# Patient Record
Sex: FEMALE | Race: BLACK OR AFRICAN AMERICAN | NOT HISPANIC OR LATINO | Employment: FULL TIME | URBAN - METROPOLITAN AREA
[De-identification: names, ages, dates, MRNs, and addresses within clinical notes are randomized per-mention and may not be internally consistent; named-entity substitution may affect disease eponyms.]

---

## 2022-12-20 ENCOUNTER — HOSPITAL ENCOUNTER (OUTPATIENT)
Dept: DATA CONVERSION | Facility: HOSPITAL | Age: 32
End: 2022-12-20
Attending: OBSTETRICS & GYNECOLOGY

## 2022-12-20 DIAGNOSIS — B00.9 HERPESVIRAL INFECTION, UNSPECIFIED: ICD-10-CM

## 2022-12-20 DIAGNOSIS — O22.02: ICD-10-CM

## 2022-12-20 DIAGNOSIS — O98.512 OTHER VIRAL DISEASES COMPLICATING PREGNANCY, SECOND TRIMESTER (HHS-HCC): ICD-10-CM

## 2022-12-20 DIAGNOSIS — R25.2 CRAMP AND SPASM: ICD-10-CM

## 2022-12-20 DIAGNOSIS — O26.892 OTHER SPECIFIED PREGNANCY RELATED CONDITIONS, SECOND TRIMESTER (HHS-HCC): ICD-10-CM

## 2022-12-20 DIAGNOSIS — N89.8 OTHER SPECIFIED NONINFLAMMATORY DISORDERS OF VAGINA: ICD-10-CM

## 2022-12-20 DIAGNOSIS — I83.90 ASYMPTOMATIC VARICOSE VEINS OF UNSPECIFIED LOWER EXTREMITY: ICD-10-CM

## 2022-12-20 DIAGNOSIS — O99.891 OTHER SPECIFIED DISEASES AND CONDITIONS COMPLICATING PREGNANCY (HHS-HCC): ICD-10-CM

## 2022-12-20 DIAGNOSIS — O09.292 SUPERVISION OF PREGNANCY WITH OTHER POOR REPRODUCTIVE OR OBSTETRIC HISTORY, SECOND TRIMESTER (HHS-HCC): ICD-10-CM

## 2022-12-20 DIAGNOSIS — O26.852 SPOTTING COMPLICATING PREGNANCY, SECOND TRIMESTER (HHS-HCC): ICD-10-CM

## 2022-12-20 DIAGNOSIS — Z3A.24 24 WEEKS GESTATION OF PREGNANCY (HHS-HCC): ICD-10-CM

## 2022-12-20 DIAGNOSIS — E66.9 OBESITY, UNSPECIFIED: ICD-10-CM

## 2022-12-20 DIAGNOSIS — O99.212 OBESITY COMPLICATING PREGNANCY, SECOND TRIMESTER (HHS-HCC): ICD-10-CM

## 2022-12-20 DIAGNOSIS — Z34.80 ENCOUNTER FOR SUPERVISION OF OTHER NORMAL PREGNANCY, UNSPECIFIED TRIMESTER (HHS-HCC): ICD-10-CM

## 2022-12-20 DIAGNOSIS — O09.32 SUPERVISION OF PREGNANCY WITH INSUFFICIENT ANTENATAL CARE, SECOND TRIMESTER (HHS-HCC): ICD-10-CM

## 2022-12-20 LAB
ESTIMATED AVERAGE GLUCOSE FOR HBA1C: 108 MG/DL
HEMOGLOBIN A1C/HEMOGLOBIN TOTAL IN BLOOD: 5.4 %

## 2022-12-21 LAB
CHLAMYDIA TRACH., AMPLIFIED: NEGATIVE
CLUE CELLS: PRESENT
N. GONORRHEA, AMPLIFIED: NEGATIVE
NUGENT SCORE: 5
TRICHOMONAS VAGINALIS: NEGATIVE
YEAST: PRESENT

## 2022-12-22 LAB
HEMOGLOBIN A2: 3 %
HEMOGLOBIN A: 96.5 %
HEMOGLOBIN F: 0.5 %
HEMOGLOBIN IDENTIFICATION INTERPRETATION: NORMAL
PATH REVIEW-HGB IDENTIFICATION: NORMAL

## 2023-03-01 NOTE — PROGRESS NOTES
"    Current Stage:   Stage: Triage     OB Dating:   EDC/EGA:  ·  Final NOLVIA 2023   ·  EGA 24.4     Subjective Data:   Antepartum:  Vaginal Bleeding: Yes  Light pink spotting   Contractions/Abdominal Pain: No   Discharge/Loss of Fluid: No   Fetal Movement: Good   Fevers/Chills: No   Preeclampsia Symptoms: No   Antepartum:    Pari is a 33yo  at 24.4wks b/o 15.5wk U/S who presents to triage for spotting and cramping.  She reports an episode of light pink spotting when wiping  after using the restroom.  She denies heavy vaginal bleeding.  She denies dysuria or flank pain.  She reports feeling \"yeasty\" but denies vaginal irritation or change in discharge.  She denies LOF and endorses good fetal movement.    Pregnancy notable for:  - Limited prenatal care  - PEC PP in last pregnancy, readmitted, on bASA  - Class III obesity  - HSV, no recent outbreaks, for suppression at 36wks  - H/o PPH after 2017 delivery, reports needing blood transfusion, T&C at delivery  - PCN allergy, hives and tongue swelling, h/o GBS+ in prior pregnancy  - Forceps assisted delivery   - Desires PPTL, states consents signed on     ObHx:     @ 39.5wks, PEC PP readmission    @ 39.5wks, c/b PPH requiring blood transfusion    @ 40.5wks    @ 36wks, poor dating    @ 42wks, forceps assisted  IAB x6  SAB x1    GynHx: Abnl pap  ASCUS w/ HPV+; Oct 2022 SAURABH I on colpo  PMH: denies  PSH: s/p cervical fusion 22, follows with neurology, completed PT  PFH: reviewed, non-contributory  Meds: PNV  All: PCN (swelling of tongue, hives), benadryl (swelling)  Social: denies e/t/i      Objective Information:    Objective Information:      T   P  R  BP   MAP  SpO2   Value  37  94  16  134/65   91  100%  Date/Time  13:07  13:45  13:07  13:23   13:23  13:45  Range  (36.6C - 37C )  (86 - 95 )  (16 - 18 )  (121 - 141 )/ (65 - 81 )  (91 - 102 )  (98% - 100% )  Highest temp of " 37 C was recorded at  13:07      Pain reported at  13:07: 0 = None      Physical Exam:   Constitutional: Alert, conversational, well-appearing   Obstetric: Cervical Exam: visually not dilated    FHT: 150, mod variability, +accels, -decels   [place]: quiet    SSE:  - No active bleeding from cervix  - No blood present in vault  - Milky white vaginal discharge present   Eyes: Sclera white, EOM intact, no discharge or erythema   ENMT: No hearing deficit, no goiter present   Head/Neck: Normocephalic, atraumatic, full range  of motion intact   Respiratory/Thorax: No increased work of breathing,  no cough present, rate normal, clear to auscultation   Cardiovascular: Generalized lower extremity edema  present, RRR   Gastrointestinal: Gravid   Genitourinary: No suprapubic tenderness   Musculoskeletal: Strength +5 in all extremities bilaterally   Extremities: No calf tenderness, redness or warmth,  varicose veins noted bilaterally on lower extremities   Neurological: A/O x3, conversational   Breast: No redness or warmth   Psychological: Behavior appropriate, interactive   Skin: No rashes or lesions      Testing:   NST Interpretation - Baby A:  ·  Baseline    ·  Variability moderate (amplitude range 6 to 25 bpm)   ·  Interpretation Appropriate for EGA (2 10x10 accels)   ·  Accelerations Present   ·  Decelerations Absent     Biophysical Profile - Baby A:  ·  Fetal Reactive Reactive non stress test     Assessment and Plan:        Additional Dx:   Non-stress test reactive: Entered Date: 20-Dec-2022 14:27   Vaginal discharge in pregnancy: Entered Date: 20-Dec-2022  14:27   24 weeks gestation of pregnancy: Entered Date: 20-Dec-2022  14:27    Assessment:    Pari is a 31yo  at 24.4wks b/o 15.5wk U/S who presents to triage for spotting and cramping.     Vaginal Discharge  - Noted to be light pink when wiping  - No active bleeding or blood present in vault  - Vaginitis swab and UC collected and sent,  will call for abnl results  - Cervix visually not dilated  - RH+ blood type    IUP at 24.4wks  - NST appropriate for gestational age  - Good fetal movement  - Encouraged prenatal care and discussed barriers to care--> pt states appts are hard to get and did not like initial provider she saw for this pregnancy and was apprehensive to care b/o this  - Has good rapport with Dr. Olmstead and has scheduled appt in January, encouraged to keep and attend  - No prenatal labs drawn, collected today, to follow-up in office for abnl results  - Precautions to return discussed     Varicose Veins  - Has attempted to get compression stockings but have not come in yet  - Nurse tasked to fulfill prescription at office visit in [place]    Maternal Well-being  - Vital signs stable and WNL  - Emotional support and reassurance provided  - All questions and concerns addressed     Plan and tracing discussed with Dr. Osorio who reviewed tracing and agrees with d/c home.     Petra Nielsen, MSN, APRN, FNP-C      Attestation:   Note Completion:  I am a:  Advanced Practice Provider   Attending Only - Shared Visit with Advanced Practice Provider This is a shared visit.  I have reviewed the Advanced Practice Provider?s encounter note, approve the Advanced Practice Provider?s documentation,  and provide the following additional information from my personal encounter.    Comments/ Additional Findings    pt seen.  no pain or ctx.    Baby quite active and FHR tracing reassuring for 24 weeks  Discharge home.  F/u at next OB appt          Electronic Signatures:  Carlton Osorio)  (Signed 20-Dec-2022 15:33)   Authored: Note Completion   Co-Signer: Assessment and Plan, Note Completion  Petra Nielsen (APRN-CNP)  (Signed 20-Dec-2022 15:21)   Authored: Current Stage, OB Dating, Subjective Data,  Objective Data,  Testing, Assessment and Plan, Note Completion      Last Updated: 20-Dec-2022 15:33 by Carlton Osorio)

## 2023-03-02 LAB — URINE CULTURE: ABNORMAL

## 2023-03-10 LAB
CREATININE (MG/DL) IN URINE: 126 MG/DL (ref 20–320)
ERYTHROCYTE DISTRIBUTION WIDTH (RATIO) BY AUTOMATED COUNT: 14.8 % (ref 11.5–14.5)
ERYTHROCYTE MEAN CORPUSCULAR HEMOGLOBIN CONCENTRATION (G/DL) BY AUTOMATED: 31.2 G/DL (ref 32–36)
ERYTHROCYTE MEAN CORPUSCULAR VOLUME (FL) BY AUTOMATED COUNT: 76 FL (ref 80–100)
ERYTHROCYTES (10*6/UL) IN BLOOD BY AUTOMATED COUNT: 3.73 X10E12/L (ref 4–5.2)
FERRITIN, PREGNANCY: 18 UG/L
HEMATOCRIT (%) IN BLOOD BY AUTOMATED COUNT: 28.5 % (ref 36–46)
HEMOGLOBIN (G/DL) IN BLOOD: 8.9 G/DL (ref 12–16)
IRON (UG/DL) IN SER/PLAS IN PREGNANCY: 251 UG/DL
IRON BINDING CAPACITY (UG/DL) IN PREGNANCY: 667 UG/DL
IRON SATURATION (%) IN PREGNANCY: 38 %
LEUKOCYTES (10*3/UL) IN BLOOD BY AUTOMATED COUNT: 8.2 X10E9/L (ref 4.4–11.3)
NRBC (PER 100 WBCS) BY AUTOMATED COUNT: 0 /100 WBC (ref 0–0)
PLATELETS (10*3/UL) IN BLOOD AUTOMATED COUNT: 157 X10E9/L (ref 150–450)
PROTEIN (MG/DL) IN URINE: 45 MG/DL (ref 5–24)
PROTEIN/CREATININE (MG/MG) IN URINE: 0.36 MG/MG CREAT (ref 0–0.17)
REFLEX ADDED, ANEMIA PANEL: ABNORMAL

## 2023-03-11 LAB — URINE CULTURE: NORMAL

## 2023-03-13 LAB — GROUP B STREP SCREEN: ABNORMAL

## 2023-03-22 LAB — URINE CULTURE: ABNORMAL

## 2023-04-06 ENCOUNTER — HOSPITAL ENCOUNTER (OUTPATIENT)
Dept: DATA CONVERSION | Facility: HOSPITAL | Age: 33
End: 2023-04-06
Attending: OBSTETRICS & GYNECOLOGY

## 2023-04-06 DIAGNOSIS — D64.9 ANEMIA, UNSPECIFIED: ICD-10-CM

## 2023-04-06 DIAGNOSIS — R51.9 HEADACHE, UNSPECIFIED: ICD-10-CM

## 2023-04-06 DIAGNOSIS — Z98.1 ARTHRODESIS STATUS: ICD-10-CM

## 2023-04-06 DIAGNOSIS — R03.0 ELEVATED BLOOD-PRESSURE READING, WITHOUT DIAGNOSIS OF HYPERTENSION: ICD-10-CM

## 2023-04-06 DIAGNOSIS — B00.9 HERPESVIRAL INFECTION, UNSPECIFIED: ICD-10-CM

## 2023-04-06 DIAGNOSIS — E66.9 OBESITY, UNSPECIFIED: ICD-10-CM

## 2023-04-06 LAB
ALANINE AMINOTRANSFERASE (SGPT) (U/L) IN SER/PLAS: 35 U/L (ref 7–45)
ALBUMIN (G/DL) IN SER/PLAS: 3.5 G/DL (ref 3.4–5)
ALKALINE PHOSPHATASE (U/L) IN SER/PLAS: 97 U/L (ref 33–110)
ANION GAP IN SER/PLAS: 16 MMOL/L (ref 10–20)
ASPARTATE AMINOTRANSFERASE (SGOT) (U/L) IN SER/PLAS: 24 U/L (ref 9–39)
BILIRUBIN TOTAL (MG/DL) IN SER/PLAS: 0.3 MG/DL (ref 0–1.2)
CALCIUM (MG/DL) IN SER/PLAS: 9 MG/DL (ref 8.6–10.6)
CARBON DIOXIDE, TOTAL (MMOL/L) IN SER/PLAS: 23 MMOL/L (ref 21–32)
CHLORIDE (MMOL/L) IN SER/PLAS: 105 MMOL/L (ref 98–107)
CREATININE (MG/DL) IN SER/PLAS: 0.5 MG/DL (ref 0.5–1.05)
ERYTHROCYTE DISTRIBUTION WIDTH (RATIO) BY AUTOMATED COUNT: 18.9 % (ref 11.5–14.5)
ERYTHROCYTE MEAN CORPUSCULAR HEMOGLOBIN CONCENTRATION (G/DL) BY AUTOMATED: 30.7 G/DL (ref 32–36)
ERYTHROCYTE MEAN CORPUSCULAR VOLUME (FL) BY AUTOMATED COUNT: 79 FL (ref 80–100)
ERYTHROCYTES (10*6/UL) IN BLOOD BY AUTOMATED COUNT: 3.82 X10E12/L (ref 4–5.2)
GFR FEMALE: >90 ML/MIN/1.73M2
GLUCOSE (MG/DL) IN SER/PLAS: 75 MG/DL (ref 74–99)
HEMATOCRIT (%) IN BLOOD BY AUTOMATED COUNT: 30.3 % (ref 36–46)
HEMOGLOBIN (G/DL) IN BLOOD: 9.3 G/DL (ref 12–16)
LEUKOCYTES (10*3/UL) IN BLOOD BY AUTOMATED COUNT: 5.6 X10E9/L (ref 4.4–11.3)
NRBC (PER 100 WBCS) BY AUTOMATED COUNT: 0 /100 WBC (ref 0–0)
PLATELETS (10*3/UL) IN BLOOD AUTOMATED COUNT: 204 X10E9/L (ref 150–450)
POTASSIUM (MMOL/L) IN SER/PLAS: 3.9 MMOL/L (ref 3.5–5.3)
PROTEIN TOTAL: 6.6 G/DL (ref 6.4–8.2)
SODIUM (MMOL/L) IN SER/PLAS: 140 MMOL/L (ref 136–145)
UREA NITROGEN (MG/DL) IN SER/PLAS: 8 MG/DL (ref 6–23)

## 2023-04-07 LAB
CREATININE (MG/DL) IN URINE: NORMAL
PROTEIN (MG/DL) IN URINE: NORMAL
PROTEIN/CREATININE (MG/MG) IN URINE: NORMAL

## 2023-09-06 VITALS — WEIGHT: 279.54 LBS | HEIGHT: 65 IN | BODY MASS INDEX: 46.57 KG/M2

## 2023-09-07 VITALS
BODY MASS INDEX: 52.37 KG/M2 | HEART RATE: 95 BPM | SYSTOLIC BLOOD PRESSURE: 121 MMHG | DIASTOLIC BLOOD PRESSURE: 75 MMHG | OXYGEN SATURATION: 100 % | HEIGHT: 60 IN | WEIGHT: 266.76 LBS | TEMPERATURE: 97.9 F | RESPIRATION RATE: 18 BRPM

## 2023-09-14 NOTE — PROGRESS NOTES
"    Current Stage:   Stage: Triage     OB Dating:   EDC/EGA:  ·  Final NOLVIA 2023   ·  EGA 24.4     Subjective Data:   Antepartum:  Vaginal Bleeding: Yes  Light pink spotting   Contractions/Abdominal Pain: No   Discharge/Loss of Fluid: No   Fetal Movement: Good   Fevers/Chills: No   Preeclampsia Symptoms: No   Antepartum:    Pari is a 31yo  at 24.4wks b/o 15.5wk U/S who presents to triage for spotting and cramping.  She reports an episode of light pink spotting when wiping  after using the restroom.  She denies heavy vaginal bleeding.  She denies dysuria or flank pain.  She reports feeling \"yeasty\" but denies vaginal irritation or change in discharge.  She denies LOF and endorses good fetal movement.    Pregnancy notable for:  - Limited prenatal care  - PEC PP in last pregnancy, readmitted, on bASA  - Class III obesity  - HSV, no recent outbreaks, for suppression at 36wks  - H/o PPH after 2017 delivery, reports needing blood transfusion, T&C at delivery  - PCN allergy, hives and tongue swelling, h/o GBS+ in prior pregnancy  - Forceps assisted delivery   - Desires PPTL, states consents signed on     ObHx:     @ 39.5wks, PEC PP readmission    @ 39.5wks, c/b PPH requiring blood transfusion    @ 40.5wks    @ 36wks, poor dating    @ 42wks, forceps assisted  IAB x6  SAB x1    GynHx: Abnl pap  ASCUS w/ HPV+; Oct 2022 SAURABH I on colpo  PMH: denies  PSH: s/p cervical fusion 22, follows with neurology, completed PT  PFH: reviewed, non-contributory  Meds: PNV  All: PCN (swelling of tongue, hives), benadryl (swelling)  Social: denies e/t/i      Objective Information:    Objective Information:      T   P  R  BP   MAP  SpO2   Value  37  94  16  134/65   91  100%  Date/Time  13:07  13:45  13:07  13:23   13:23  13:45  Range  (36.6C - 37C )  (86 - 95 )  (16 - 18 )  (121 - 141 )/ (65 - 81 )  (91 - 102 )  (98% - 100% )  Highest temp of " 37 C was recorded at  13:07      Pain reported at  13:07: 0 = None      Physical Exam:   Constitutional: Alert, conversational, well-appearing   Obstetric: Cervical Exam: visually not dilated    FHT: 150, mod variability, +accels, -decels   [place]: quiet    SSE:  - No active bleeding from cervix  - No blood present in vault  - Milky white vaginal discharge present   Eyes: Sclera white, EOM intact, no discharge or erythema   ENMT: No hearing deficit, no goiter present   Head/Neck: Normocephalic, atraumatic, full range  of motion intact   Respiratory/Thorax: No increased work of breathing,  no cough present, rate normal, clear to auscultation   Cardiovascular: Generalized lower extremity edema  present, RRR   Gastrointestinal: Gravid   Genitourinary: No suprapubic tenderness   Musculoskeletal: Strength +5 in all extremities bilaterally   Extremities: No calf tenderness, redness or warmth,  varicose veins noted bilaterally on lower extremities   Neurological: A/O x3, conversational   Breast: No redness or warmth   Psychological: Behavior appropriate, interactive   Skin: No rashes or lesions      Testing:   NST Interpretation - Baby A:  ·  Baseline    ·  Variability moderate (amplitude range 6 to 25 bpm)   ·  Interpretation Appropriate for EGA (2 10x10 accels)   ·  Accelerations Present   ·  Decelerations Absent     Biophysical Profile - Baby A:  ·  Fetal Reactive Reactive non stress test     Assessment and Plan:        Additional Dx:   Non-stress test reactive: Entered Date: 20-Dec-2022 14:27   Vaginal discharge in pregnancy: Entered Date: 20-Dec-2022  14:27   24 weeks gestation of pregnancy: Entered Date: 20-Dec-2022  14:27    Assessment:    Pari is a 33yo  at 24.4wks b/o 15.5wk U/S who presents to triage for spotting and cramping.     Vaginal Discharge  - Noted to be light pink when wiping  - No active bleeding or blood present in vault  - Vaginitis swab and UC collected and sent,  will call for abnl results  - Cervix visually not dilated  - RH+ blood type    IUP at 24.4wks  - NST appropriate for gestational age  - Good fetal movement  - Encouraged prenatal care and discussed barriers to care--> pt states appts are hard to get and did not like initial provider she saw for this pregnancy and was apprehensive to care b/o this  - Has good rapport with Dr. Olmstead and has scheduled appt in January, encouraged to keep and attend  - No prenatal labs drawn, collected today, to follow-up in office for abnl results  - Precautions to return discussed     Varicose Veins  - Has attempted to get compression stockings but have not come in yet  - Nurse tasked to fulfill prescription at office visit in [place]    Maternal Well-being  - Vital signs stable and WNL  - Emotional support and reassurance provided  - All questions and concerns addressed     Plan and tracing discussed with Dr. Osorio who reviewed tracing and agrees with d/c home.     Petra Nielsen, MSN, APRN, FNP-C      Attestation:   Note Completion:  I am a:  Advanced Practice Provider   Attending Only - Shared Visit with Advanced Practice Provider This is a shared visit.  I have reviewed the Advanced Practice Provider?s encounter note, approve the Advanced Practice Provider?s documentation,  and provide the following additional information from my personal encounter.    Comments/ Additional Findings    pt seen.  no pain or ctx.    Baby quite active and FHR tracing reassuring for 24 weeks  Discharge home.  F/u at next OB appt          Electronic Signatures:  Carlton Osorio)  (Signed 20-Dec-2022 15:33)   Authored: Note Completion   Co-Signer: Assessment and Plan, Note Completion  Petra Nielsen (APRN-CNP)  (Signed 20-Dec-2022 15:21)   Authored: Current Stage, OB Dating, Subjective Data,  Objective Data,  Testing, Assessment and Plan, Note Completion      Last Updated: 20-Dec-2022 15:33 by Carlton Osorio)

## 2023-09-14 NOTE — PROGRESS NOTES
Current Stage:   Stage: Post-partum     Subjective Data:   Post Partum:  Postpartum:    33yo  now 5 days postpartum presenting for elevated BPs    Patient has a history of PEC in prior pregnancy, no known HTN diagnosis this pregnany. Had one mild range BP during admission for delivery and was sent home with a BP cuff.  Patient has been keeping track of blood pressures daily in the morning with numbers in the 130s/80s until today when she noticed a BP of 150s/90s. She had an accompanying mild headache for which she took 2 tylenol; since then, her headache has been at  a 3/10 and she states that eating has made the headache less in severity. ROS is otherwise positive for bilateral leg swelling and some left-sided rib pain. Patient denies changes in vision, RUQ pain, chest pain, or dyspnea.     Pregnancy notable for:  - PEC PP in last pregnancy requiring readmission. Baseline HELLP labs wnl. P:C 0.36 (collected at 36wks)  - Anemia, last hgb 8.9 (3/14). S/p IV iron  x2  - Class III obesity, BMI 50  - HSV, on Valtrex suppression , no recent flares  - H/o PPH after 2017 delivery, reports needing blood transfusion  - UTI, s/p Bactrim. Last ucx positive for coag negative staph (3/22)  - PCN allergy, hives and tongue swelling  - Forceps assisted delivery   - Desires PPTL, consents signed on     ObHx:     at 39wks    @ 39.5wks, PEC PP readmission    @ 39.5wks, c/b PPH requiring blood transfusion    @ 40.5wks    @ 36wks, poor dating    @ 42wks, forceps assisted  IAB x6  SAB x1    GynHx: Abnl pap 2020 ASCUS w/ HPV+; Oct 2022 SAURABH I on colpo. Last pap NILM, HPV negative ()  PMH: cervical spondylosis with myelopathy  PSH: s/p cervical fusion 22, follows with neurology, completed PT  PFH: reviewed, non-contributory  Meds: PNV  All: PCN (swelling of tongue, hives), benadryl (swelling)  Social: denies e/t/i                Objective  Information:    Objective Information:      T   P  R  BP   MAP  SpO2   Value  36.7  85  18  144/80   101  94%  Date/Time  11:05  12:15  11:05  12:06   12:06  12:15  Range  (36.7C - 36.7C )  (75 - 86 )  (18 - 18 )  (141 - 151 )/ (78 - 87 )  (101 - 113 )  (94% - 99% )      Pain reported at  11:05: 0 = None      Physical Exam:   Constitutional: alert, oriented   Obstetric: fundus firm below umbilicus   Head/Neck: neck supple   Respiratory/Thorax: breathing comfortably on room  air   Gastrointestinal: soft, nontedner   Extremities: 1+ nonpitting bilateral lower extremity  edema   Neurological: awake and conversant   Psychological: appropriate affect   Skin: no rashes or lesions     Recent Lab Results:    Results:        I have reviewed these laboratory results:    Complete Blood Count  2023 11:27:00      Result Value    White Blood Cell Count  5.6    Nucleated Erythrocyte Count  0.0    Red Blood Cell Count  3.82   L   HGB  9.3   L   HCT  30.3   L   MCV  79   L   MCHC  30.7   L   PLT  204    RDW-CV  18.9   H     Comprehensive Metabolic Panel  2023 11:27:00      Result Value    Glucose, Serum  75    NA  140    K  3.9    CL  105    Bicarbonate, Serum  23    Anion Gap, Serum  16    BUN  8    CREAT  0.50    GFR Female  >90    Calcium, Serum  9.0    ALB  3.5    ALKP  97    T Pro  6.6    T Bili  0.3    Alanine Aminotransferase, Serum  35    Aspartate Transaminase, Serum  24        Assessment and Plan:   Assessment:    33 yo  now  5 days postpartum presenting for elevated BPs (140s-150s/80s-90s) with associated mild headache.    # Gestational Hypertension  -  Now meeting criteria for gHTN based on mild range BPs >4 hours apart        -     BPs cycled in triage, no severe range BPs  -  HELLP labs negative  -     3/10 headache resolved after tylenol, ibuprofen and food  -     Will start on amlodipine 5mg daily  -      F/u with provider on Monday for repeat BP check   -     Strict return  precautions provided including severe range BPs and PEC symptoms    #Postpartum  -  No signs of postpartum blues  -  Breastfeeding        -     Light lochia, fundus firm    Shirley Aguiar, MS3    Patient seen and evaluated with medical student. Agree with assessment above, edits made within text.    Patient discussed with Dr. Aida Mcdonald MD  PGY-2, Obstetrics and Gynecology      Attestation:   Note Completion:  I am a:  Resident/Fellow   Attending Attestation I saw and evaluated the patient.  I personally obtained the key and critical portions of the history and physical exam or was physically present for key and  critical portions performed by the resident/fellow. I reviewed the resident/fellow?s documentation and discussed the patient with the resident/fellow.  I agree with the resident/fellow?s medical decision making as documented in the note.     I personally evaluated the patient on 06-Apr-2023   Comments/ Additional Findings    Patient reliable, serially checking BP's at home. Strongly desires discharge home. Short interval follow up for BP check scheduled. Warning signs  reviewed.     Sharda Parra MD   [facility] Attending           Electronic Signatures:  Sharda Parra)  (Signed 06-Apr-2023 15:42)   Authored: Note Completion   Co-Signer: Current Stage, Subjective Data, Objective Data, Assessment and Plan, Note Completion  Shirley Aguiar (MED STUD)  (Signed 06-Apr-2023 12:30)   Authored: Current Stage, Subjective Data, Objective Data,  Assessment and Plan, Note Completion  Annetta Mcdonald (Resident))  (Signed 06-Apr-2023 12:56)   Entered: Subjective Data, Objective Data, Assessment  and Plan, Note Completion   Authored: Current Stage, Subjective Data, Objective Data, Assessment and Plan, Note Completion   Co-Signer: Current Stage, Subjective Data, Objective Data, Assessment and Plan, Note Completion      Last Updated: 06-Apr-2023 15:42 by Sharda Parra)

## 2024-01-20 PROBLEM — R87.619 ABNORMAL PAP SMEAR OF CERVIX: Status: ACTIVE | Noted: 2024-01-20

## 2024-01-20 PROBLEM — Z87.59 HISTORY OF PRE-ECLAMPSIA: Status: ACTIVE | Noted: 2024-01-20

## 2024-01-25 ENCOUNTER — HOSPITAL ENCOUNTER (EMERGENCY)
Facility: HOSPITAL | Age: 34
Discharge: HOME | End: 2024-01-25
Payer: MEDICAID

## 2024-01-25 VITALS
WEIGHT: 260 LBS | RESPIRATION RATE: 17 BRPM | TEMPERATURE: 98.2 F | SYSTOLIC BLOOD PRESSURE: 142 MMHG | BODY MASS INDEX: 43.32 KG/M2 | HEART RATE: 84 BPM | OXYGEN SATURATION: 98 % | DIASTOLIC BLOOD PRESSURE: 82 MMHG | HEIGHT: 65 IN

## 2024-01-25 DIAGNOSIS — N39.0 URINARY TRACT INFECTION IN FEMALE: Primary | ICD-10-CM

## 2024-01-25 DIAGNOSIS — Z34.91 FIRST TRIMESTER PREGNANCY (HHS-HCC): ICD-10-CM

## 2024-01-25 LAB
APPEARANCE UR: ABNORMAL
BACTERIA #/AREA URNS AUTO: ABNORMAL /HPF
BILIRUB UR STRIP.AUTO-MCNC: NEGATIVE MG/DL
C TRACH RRNA SPEC QL NAA+PROBE: NEGATIVE
CLUE CELLS SPEC QL WET PREP: NORMAL
COLOR UR: YELLOW
GLUCOSE UR STRIP.AUTO-MCNC: NEGATIVE MG/DL
KETONES UR STRIP.AUTO-MCNC: ABNORMAL MG/DL
LEUKOCYTE ESTERASE UR QL STRIP.AUTO: ABNORMAL
MUCOUS THREADS #/AREA URNS AUTO: ABNORMAL /LPF
N GONORRHOEA DNA SPEC QL PROBE+SIG AMP: NEGATIVE
NITRITE UR QL STRIP.AUTO: POSITIVE
PH UR STRIP.AUTO: 6 [PH]
PREGNANCY TEST URINE, POC: POSITIVE
PROT UR STRIP.AUTO-MCNC: ABNORMAL MG/DL
RBC # UR STRIP.AUTO: NEGATIVE /UL
RBC #/AREA URNS AUTO: ABNORMAL /HPF
SP GR UR STRIP.AUTO: 1.03
SQUAMOUS #/AREA URNS AUTO: ABNORMAL /HPF
T VAGINALIS SPEC QL WET PREP: NORMAL
UROBILINOGEN UR STRIP.AUTO-MCNC: 2 MG/DL
WBC #/AREA URNS AUTO: ABNORMAL /HPF
WBC VAG QL WET PREP: NORMAL
YEAST VAG QL WET PREP: NORMAL

## 2024-01-25 PROCEDURE — 87800 DETECT AGNT MULT DNA DIREC: CPT | Performed by: PHYSICIAN ASSISTANT

## 2024-01-25 PROCEDURE — 99283 EMERGENCY DEPT VISIT LOW MDM: CPT

## 2024-01-25 PROCEDURE — 87210 SMEAR WET MOUNT SALINE/INK: CPT | Performed by: PHYSICIAN ASSISTANT

## 2024-01-25 PROCEDURE — 99284 EMERGENCY DEPT VISIT MOD MDM: CPT | Performed by: PHYSICIAN ASSISTANT

## 2024-01-25 PROCEDURE — 2500000005 HC RX 250 GENERAL PHARMACY W/O HCPCS: Mod: SE | Performed by: PHYSICIAN ASSISTANT

## 2024-01-25 PROCEDURE — 81025 URINE PREGNANCY TEST: CPT | Performed by: PHYSICIAN ASSISTANT

## 2024-01-25 PROCEDURE — 81001 URINALYSIS AUTO W/SCOPE: CPT | Performed by: PHYSICIAN ASSISTANT

## 2024-01-25 PROCEDURE — 2500000004 HC RX 250 GENERAL PHARMACY W/ HCPCS (ALT 636 FOR OP/ED): Mod: SE | Performed by: PHYSICIAN ASSISTANT

## 2024-01-25 PROCEDURE — 87086 URINE CULTURE/COLONY COUNT: CPT | Performed by: PHYSICIAN ASSISTANT

## 2024-01-25 PROCEDURE — 99284 EMERGENCY DEPT VISIT MOD MDM: CPT

## 2024-01-25 RX ORDER — ONDANSETRON 4 MG/1
4 TABLET, ORALLY DISINTEGRATING ORAL ONCE
Status: COMPLETED | OUTPATIENT
Start: 2024-01-25 | End: 2024-01-25

## 2024-01-25 RX ORDER — SULFAMETHOXAZOLE AND TRIMETHOPRIM 800; 160 MG/1; MG/1
1 TABLET ORAL ONCE
Status: COMPLETED | OUTPATIENT
Start: 2024-01-25 | End: 2024-01-25

## 2024-01-25 RX ORDER — SULFAMETHOXAZOLE AND TRIMETHOPRIM 800; 160 MG/1; MG/1
1 TABLET ORAL 2 TIMES DAILY
Qty: 14 TABLET | Refills: 0 | Status: SHIPPED | OUTPATIENT
Start: 2024-01-25 | End: 2024-01-30

## 2024-01-25 RX ORDER — ONDANSETRON HYDROCHLORIDE 2 MG/ML
4 INJECTION, SOLUTION INTRAVENOUS ONCE
Status: DISCONTINUED | OUTPATIENT
Start: 2024-01-25 | End: 2024-01-25

## 2024-01-25 RX ADMIN — ONDANSETRON 4 MG: 4 TABLET, ORALLY DISINTEGRATING ORAL at 16:15

## 2024-01-25 RX ADMIN — SULFAMETHOXAZOLE AND TRIMETHOPRIM 1 TABLET: 800; 160 TABLET ORAL at 16:20

## 2024-01-25 ASSESSMENT — COLUMBIA-SUICIDE SEVERITY RATING SCALE - C-SSRS
1. IN THE PAST MONTH, HAVE YOU WISHED YOU WERE DEAD OR WISHED YOU COULD GO TO SLEEP AND NOT WAKE UP?: NO
6. HAVE YOU EVER DONE ANYTHING, STARTED TO DO ANYTHING, OR PREPARED TO DO ANYTHING TO END YOUR LIFE?: NO
2. HAVE YOU ACTUALLY HAD ANY THOUGHTS OF KILLING YOURSELF?: NO

## 2024-01-25 NOTE — ED PROVIDER NOTES
HPI   No chief complaint on file.      HPI: Patient is a 33 year old A1 pregnant female who presents to the ED for abdominal pain and vaginal bleeding that started today.  Patient states last normal menstrual period was .  Estimates that she is about 7 weeks along her pregnancy otherwise not had any ultrasounds at this point.  States that she developed bleeding which she describes more as vaginal spotting and pelvic cramping about an hour and a half prior to arrival.  States that she does have a past history of miscarriage and was concerned for the same today.  She notes associated increased urination, nausea and vomiting.  Denies any fevers, chills, hematuria, dysuria or vaginal discharge.  ------------------------------------------------------------------------------------------------------------------------------------------  ROS: a ten point review of systems was performed and was negative except as per HPI.  ------------------------------------------------------------------------------------------------------------------------------------------  PMH / PSH: as per HPI, otherwise reviewed   MEDS: as per HPI, otherwise reviewed in EMR  ALLERGIES: as per HPI, otherwise reviewed in EMR  SocH:  as per HPI, otherwise reviewed in EMR  FH:  as per HPI, otherwise reviewed in EMR   ------------------------------------------------------------------------------------------------------------------------------------------  Physical Exam:  VS: As documented in the triage note and EMR flowsheet from this visit was reviewed  General: Well appearing. No acute distress.   Eyes:  Extraocular movements grossly intact. No scleral icterus.   Head: Atraumatic. Normocephalic.     Neck: No meningismus. No gross masses. Full movement through range of motion  CV: Regular rhythm. No murmurs, rubs, gallops appreciated.   Resp: Clear to auscultation bilaterally. No respiratory distress.    GI: Nontender. Soft. No masses. No rebound,  rigidity or guarding.  PELVIC EXAM: Chaperone present. Speculum exam shows no bleeding, ulcerations, lesions. Bimanual exam shows no adnexal pain or mass. No cervical motion tenderness.  Cervical os is closed.  Small amount of white homogenous discharge.  MSK: Symmetric muscle bulk. No gross step offs or deformities.  Skin: Warm, dry. No rashes  Neuro: CN II-VII intact. A&O x3. Speech fluent. Alert. Moving all extremities. Ambulates with normal gait  Psych: Appropriate mood and affect for situation  ------------------------------------------------------------------------------------------------------------------------------------------  Hospital Course / Medical Decision Making: Patient is a  pregnant female who presents to the ED for vaginal bleeding and abdominal pain in early pregnancy.  On examination, patient is well-appearing.  Vitals are stable.  Abdominal examination is benign.  Pelvic examination revealed small amount of white homogenous discharge, cervical os is closed.  No adnexal or cervical motion tenderness.  Initial plan was to obtain laboratory studies and transvaginal ultrasound.  However, patient states that since she was not having any bleeding on her pelvic examination that she did not wanted to pursue the transvaginal ultrasound.  States that she is going to her second appointment at [facility] tomorrow to have an  for this pregnancy.  She states that she came in because she did not know if the bleeding would affect the .  She declines any further workup or transvaginal ultrasound at this time, stating that she has to leave to  her children.  Given that patient is having increased urinary frequency I did send her wet prep and urine.  Urinalysis did show moderate leukocyte esterases, 21-50 white blood cells and 4+ bacteria.  Patient was treated with Bactrim and written a prescription for this.  Patient had to leave to  her children prior to her wet prep returning.                            No data recorded                Patient History   Past Medical History:   Diagnosis Date    Abnormal Pap smear of cervix     Herpes     History of pre-eclampsia     Hx of gonorrhea     Hx of trichomoniasis     Obesity, unspecified 01/05/2017    Obesity, Class II, BMI 35-39.9     Past Surgical History:   Procedure Laterality Date    OTHER SURGICAL HISTORY  02/16/2022    Anterior cervical vertebral fusion     No family history on file.  Social History     Tobacco Use    Smoking status: Not on file    Smokeless tobacco: Not on file   Substance Use Topics    Alcohol use: Not on file    Drug use: Not on file       Physical Exam   ED Triage Vitals [01/25/24 1405]   Temperature Heart Rate Respirations BP   36.8 °C (98.2 °F) 84 17 142/82      Pulse Ox Temp Source Heart Rate Source Patient Position   98 % Oral Monitor --      BP Location FiO2 (%)     -- --       Physical Exam    ED Course & MDM   Diagnoses as of 01/25/24 1623   Urinary tract infection in female   First trimester pregnancy       Medical Decision Making      Procedure  Procedures     Karon Rubio PA-C  01/25/24 1626

## 2024-01-26 LAB — HOLD SPECIMEN: NORMAL

## 2024-01-27 LAB — BACTERIA UR CULT: ABNORMAL

## 2024-01-31 ENCOUNTER — TELEPHONE (OUTPATIENT)
Dept: OBSTETRICS AND GYNECOLOGY | Facility: HOSPITAL | Age: 34
End: 2024-01-31
Payer: MEDICAID

## 2024-01-31 NOTE — TELEPHONE ENCOUNTER
Verified patient by name and .   Patient calling in regarding ED results.   Let patient know ED was trying to reach her with results of urine culture.   Urine culture showed UTI, patient stated she was given treatment in ED and is taking as prescribed.   Patient reports she had an  Saturday and is still bleeding.   Discussed with patient that bleeding after an  can happen for a few days to weeks.   Patient denies heavy bleeding, not soaking a pad in an hour for more than 1 hour.   Reviewed bleeding precautions with patient and when to seek care.  Patient verbalized understanding dn all questions and concerns addressed.

## 2024-02-25 RX ORDER — FERROUS SULFATE TAB 325 MG (65 MG ELEMENTAL FE) 325 (65 FE) MG
1 TAB ORAL
Qty: 60 TABLET | Refills: 2 | Status: SHIPPED | OUTPATIENT
Start: 2024-02-25

## 2024-07-27 ENCOUNTER — APPOINTMENT (OUTPATIENT)
Dept: RADIOLOGY | Facility: HOSPITAL | Age: 34
End: 2024-07-27
Payer: MEDICAID

## 2024-07-27 ENCOUNTER — CLINICAL SUPPORT (OUTPATIENT)
Dept: EMERGENCY MEDICINE | Facility: HOSPITAL | Age: 34
End: 2024-07-27
Payer: MEDICAID

## 2024-07-27 ENCOUNTER — HOSPITAL ENCOUNTER (EMERGENCY)
Facility: HOSPITAL | Age: 34
Discharge: HOME | End: 2024-07-27
Payer: MEDICAID

## 2024-07-27 VITALS
TEMPERATURE: 96.8 F | WEIGHT: 278 LBS | RESPIRATION RATE: 16 BRPM | DIASTOLIC BLOOD PRESSURE: 84 MMHG | HEIGHT: 65 IN | OXYGEN SATURATION: 95 % | SYSTOLIC BLOOD PRESSURE: 136 MMHG | HEART RATE: 95 BPM | BODY MASS INDEX: 46.32 KG/M2

## 2024-07-27 DIAGNOSIS — R60.9 PERIPHERAL EDEMA: Primary | ICD-10-CM

## 2024-07-27 LAB
ALBUMIN SERPL BCP-MCNC: 4 G/DL (ref 3.4–5)
ALP SERPL-CCNC: 63 U/L (ref 33–110)
ALT SERPL W P-5'-P-CCNC: 23 U/L (ref 7–45)
ANION GAP SERPL CALC-SCNC: 10 MMOL/L (ref 10–20)
AST SERPL W P-5'-P-CCNC: 18 U/L (ref 9–39)
B-HCG SERPL-ACNC: <3 MIU/ML
BASOPHILS # BLD AUTO: 0.03 X10*3/UL (ref 0–0.1)
BASOPHILS NFR BLD AUTO: 0.4 %
BILIRUB SERPL-MCNC: 0.3 MG/DL (ref 0–1.2)
BNP SERPL-MCNC: 19 PG/ML (ref 0–99)
BUN SERPL-MCNC: 9 MG/DL (ref 6–23)
CALCIUM SERPL-MCNC: 9.3 MG/DL (ref 8.6–10.6)
CHLORIDE SERPL-SCNC: 104 MMOL/L (ref 98–107)
CO2 SERPL-SCNC: 26 MMOL/L (ref 21–32)
CREAT SERPL-MCNC: 0.84 MG/DL (ref 0.5–1.05)
EGFRCR SERPLBLD CKD-EPI 2021: >90 ML/MIN/1.73M*2
EOSINOPHIL # BLD AUTO: 0.36 X10*3/UL (ref 0–0.7)
EOSINOPHIL NFR BLD AUTO: 4.9 %
ERYTHROCYTE [DISTWIDTH] IN BLOOD BY AUTOMATED COUNT: 15 % (ref 11.5–14.5)
GLUCOSE SERPL-MCNC: 110 MG/DL (ref 74–99)
HCT VFR BLD AUTO: 33.8 % (ref 36–46)
HGB BLD-MCNC: 10.3 G/DL (ref 12–16)
IMM GRANULOCYTES # BLD AUTO: 0.02 X10*3/UL (ref 0–0.7)
IMM GRANULOCYTES NFR BLD AUTO: 0.3 % (ref 0–0.9)
LYMPHOCYTES # BLD AUTO: 1.71 X10*3/UL (ref 1.2–4.8)
LYMPHOCYTES NFR BLD AUTO: 23.1 %
MCH RBC QN AUTO: 23.6 PG (ref 26–34)
MCHC RBC AUTO-ENTMCNC: 30.5 G/DL (ref 32–36)
MCV RBC AUTO: 77 FL (ref 80–100)
MONOCYTES # BLD AUTO: 0.39 X10*3/UL (ref 0.1–1)
MONOCYTES NFR BLD AUTO: 5.3 %
NEUTROPHILS # BLD AUTO: 4.89 X10*3/UL (ref 1.2–7.7)
NEUTROPHILS NFR BLD AUTO: 66 %
NRBC BLD-RTO: 0 /100 WBCS (ref 0–0)
PLATELET # BLD AUTO: 236 X10*3/UL (ref 150–450)
POTASSIUM SERPL-SCNC: 3.3 MMOL/L (ref 3.5–5.3)
PREGNANCY TEST URINE, POC: NEGATIVE
PROT SERPL-MCNC: 7.3 G/DL (ref 6.4–8.2)
RBC # BLD AUTO: 4.37 X10*6/UL (ref 4–5.2)
SODIUM SERPL-SCNC: 137 MMOL/L (ref 136–145)
WBC # BLD AUTO: 7.4 X10*3/UL (ref 4.4–11.3)

## 2024-07-27 PROCEDURE — 83880 ASSAY OF NATRIURETIC PEPTIDE: CPT | Performed by: NURSE PRACTITIONER

## 2024-07-27 PROCEDURE — 85025 COMPLETE CBC W/AUTO DIFF WBC: CPT | Performed by: NURSE PRACTITIONER

## 2024-07-27 PROCEDURE — 99284 EMERGENCY DEPT VISIT MOD MDM: CPT | Performed by: NURSE PRACTITIONER

## 2024-07-27 PROCEDURE — 71046 X-RAY EXAM CHEST 2 VIEWS: CPT | Performed by: RADIOLOGY

## 2024-07-27 PROCEDURE — 93005 ELECTROCARDIOGRAM TRACING: CPT

## 2024-07-27 PROCEDURE — 99283 EMERGENCY DEPT VISIT LOW MDM: CPT | Mod: 25

## 2024-07-27 PROCEDURE — 80053 COMPREHEN METABOLIC PANEL: CPT | Performed by: NURSE PRACTITIONER

## 2024-07-27 PROCEDURE — 81025 URINE PREGNANCY TEST: CPT | Performed by: NURSE PRACTITIONER

## 2024-07-27 PROCEDURE — 36415 COLL VENOUS BLD VENIPUNCTURE: CPT | Performed by: NURSE PRACTITIONER

## 2024-07-27 PROCEDURE — 84702 CHORIONIC GONADOTROPIN TEST: CPT | Performed by: NURSE PRACTITIONER

## 2024-07-27 PROCEDURE — 71046 X-RAY EXAM CHEST 2 VIEWS: CPT

## 2024-07-27 RX ORDER — POTASSIUM CHLORIDE 20 MEQ/1
40 TABLET, EXTENDED RELEASE ORAL ONCE
Status: DISCONTINUED | OUTPATIENT
Start: 2024-07-27 | End: 2024-07-27 | Stop reason: HOSPADM

## 2024-07-27 ASSESSMENT — COLUMBIA-SUICIDE SEVERITY RATING SCALE - C-SSRS
1. IN THE PAST MONTH, HAVE YOU WISHED YOU WERE DEAD OR WISHED YOU COULD GO TO SLEEP AND NOT WAKE UP?: NO
2. HAVE YOU ACTUALLY HAD ANY THOUGHTS OF KILLING YOURSELF?: NO
6. HAVE YOU EVER DONE ANYTHING, STARTED TO DO ANYTHING, OR PREPARED TO DO ANYTHING TO END YOUR LIFE?: NO

## 2024-07-27 NOTE — ED TRIAGE NOTES
Pt complaints of bilateral ankle pain and swelling for the past 2 days. Pt states her ankles usually get swollen when she is pregnant but took an at home test that was negative. Pt also complains of headache for the past 2 days. Pt denies CP, SOB. HX of preclampsia

## 2024-07-27 NOTE — ED PROVIDER NOTES
Emergency Department Encounter  [facility] EMERGENCY MEDICINE    Patient: Pari Mayes  MRN: [id number]  : 1990  Date of Evaluation: 2024  ED Provider: ROBERT Rascon      Chief Complaint       Chief Complaint   Patient presents with    Joint Swelling        Limitations to History: none  Historian: patient  Records reviewed: EMR inpatient and outpatient notes, Care Everywhere    This is a 33-year-old female without any significant PMH who presents to the emergency room with bilateral ankle swelling.  Patient states that she developed ankle swelling over the last couple of days.  Denies any chest pain or SOB. Patient reports her symptoms are better in the morning after elevating her lower extremities. Patient believes that her symptoms may be due to weight gain.  Patient has been eating an increased amount of salty foods over the last couple of days.  Denies any recent fall or injury.  Denies any calf pain.    PMH: Denies  PSH: Spinal surgery  Allergies: Benadryl, PCN  Social HX: Denies smoking, alcohol or drug use.  Family HX: NO family history pertinent to current presenting problem    ROS:     Review of Systems   Cardiovascular:  Positive for leg swelling.     14 systems reviewed and otherwise acutely negative except as in the [place].        Past History     Past Medical History:   Diagnosis Date    Abnormal Pap smear of cervix     Herpes     History of pre-eclampsia     Hx of gonorrhea     Hx of trichomoniasis     Obesity, unspecified 2017    Obesity, Class II, BMI 35-39.9     Past Surgical History:   Procedure Laterality Date    OTHER SURGICAL HISTORY  2022    Anterior cervical vertebral fusion         Medications/Allergies     Previous Medications    FERROUS SULFATE, 325 MG FERROUS SULFATE, (FEROSUL) TABLET    take 1 tablet by mouth twice a day with meals     Allergies   Allergen Reactions    Benadryl Allergy Decongestant Swelling    Penicillins Swelling and  Rash     Tongue swelling, generalized edema        Physical Exam       ED Triage Vitals [07/27/24 1612]   Temperature Heart Rate Respirations BP   36 °C (96.8 °F) 95 16 136/84      Pulse Ox Temp Source Heart Rate Source Patient Position   95 % Tympanic Monitor Lying      BP Location FiO2 (%)     Right arm --       Physical Exam:    Appearance: Alert, oriented , cooperative,  in no acute distress. Well nourished & well hydrated.    Skin: Intact,  dry skin, no lesions, rash, petechiae or purpura.     ENT: Hearing grossly intact. External auditory canals patent, tympanic membranes intact with visible landmarks. Nares patent, mucus membranes moist. Dentition without lesions. Pharynx clear, uvula midline.     Neck: Supple, without meningismus.    Pulmonary: Clear bilaterally with good chest wall excursion. No rales, rhonchi or wheezing. No accessory muscle use or stridor.    Cardiac: Normal S1, S2 without murmur, rub, gallop or extrasystole. No JVD, Carotids without bruits.    Abdomen: Soft, nontender, active bowel sounds.  No palpable organomegaly.  No rebound or guarding.     Musculoskeletal: Full range of motion. no pain or deformity. Pulses full and equal. Mild bilateral swelling to her feet.    Neurological:  Normal sensation, no weakness, no focal findings identified.    Psychiatric: Appropriate mood and affect.       Diagnostics   Labs:  Results for orders placed or performed during the hospital encounter of 07/27/24 (from the past 24 hour(s))   CBC and Auto Differential   Result Value Ref Range    WBC 7.4 4.4 - 11.3 x10*3/uL    nRBC 0.0 0.0 - 0.0 /100 WBCs    RBC 4.37 4.00 - 5.20 x10*6/uL    Hemoglobin 10.3 (L) 12.0 - 16.0 g/dL    Hematocrit 33.8 (L) 36.0 - 46.0 %    MCV 77 (L) 80 - 100 fL    MCH 23.6 (L) 26.0 - 34.0 pg    MCHC 30.5 (L) 32.0 - 36.0 g/dL    RDW 15.0 (H) 11.5 - 14.5 %    Platelets 236 150 - 450 x10*3/uL    Neutrophils % 66.0 40.0 - 80.0 %    Immature Granulocytes %, Automated 0.3 0.0 - 0.9 %     "Lymphocytes % 23.1 13.0 - 44.0 %    Monocytes % 5.3 2.0 - 10.0 %    Eosinophils % 4.9 0.0 - 6.0 %    Basophils % 0.4 0.0 - 2.0 %    Neutrophils Absolute 4.89 1.20 - 7.70 x10*3/uL    Immature Granulocytes Absolute, Automated 0.02 0.00 - 0.70 x10*3/uL    Lymphocytes Absolute 1.71 1.20 - 4.80 x10*3/uL    Monocytes Absolute 0.39 0.10 - 1.00 x10*3/uL    Eosinophils Absolute 0.36 0.00 - 0.70 x10*3/uL    Basophils Absolute 0.03 0.00 - 0.10 x10*3/uL   Comprehensive metabolic panel   Result Value Ref Range    Glucose 110 (H) 74 - 99 mg/dL    Sodium 137 136 - 145 mmol/L    Potassium 3.3 (L) 3.5 - 5.3 mmol/L    Chloride 104 98 - 107 mmol/L    Bicarbonate 26 21 - 32 mmol/L    Anion Gap 10 10 - 20 mmol/L    Urea Nitrogen 9 6 - 23 mg/dL    Creatinine 0.84 0.50 - 1.05 mg/dL    eGFR >90 >60 mL/min/1.73m*2    Calcium 9.3 8.6 - 10.6 mg/dL    Albumin 4.0 3.4 - 5.0 g/dL    Alkaline Phosphatase 63 33 - 110 U/L    Total Protein 7.3 6.4 - 8.2 g/dL    AST 18 9 - 39 U/L    Bilirubin, Total 0.3 0.0 - 1.2 mg/dL    ALT 23 7 - 45 U/L   B-type natriuretic peptide   Result Value Ref Range    BNP 19 0 - 99 pg/mL   hCG, quantitative, pregnancy   Result Value Ref Range    HCG, Beta-Quantitative <3 <5 mIU/mL   POCT pregnancy, urine   Result Value Ref Range    Preg Test, Ur Negative Negative      Radiographs:  XR chest 2 views   Final Result   1. No acute cardiopulmonary process.        MACRO:   None.        Signed by: Haily Evans 7/27/2024 6:03 PM   Dictation workstation:   [id number]              Assessment   In brief, Pari Mayes is a 33 y.o. female who presented to the emergency department with bilateral ankle swelling.          ED Course/MDM     Diagnoses as of 07/27/24 1816   Peripheral edema      Visit Vitals  /84 (BP Location: Right arm, Patient Position: Lying)   Pulse 95   Temp 36 °C (96.8 °F) (Tympanic)   Resp 16   Ht 1.651 m (5' 5\")   Wt 126 kg (278 lb)   SpO2 95%   BMI 46.26 kg/m²   BSA 2.4 m²       Medications "   potassium chloride CR (Klor-Con M20) ER tablet 40 mEq (has no administration in time range)       Patient remained stable while in the emergency department. Previous outpatient and ED records were reviewed. Outside records were reviewed.  Differentials include DVT, CHF, peripheral edema.  IV was established and labs obtained.  CBC with a slightly low with stable hemoglobin hematocrit of 10.3 and 33.8.  Comprehensive metabolic panel with a potassium of 3.3, otherwise unremarkable.  40 mEq of oral potassium was ordered in the emergency room.  Beta quantitative hCG was less than 3.  Chest x-ray shows no acute cardiopulmonary process.  BNP was 19.  Symptoms likely due to peripheral edema.  Discussed a low-sodium diet with the patient.  Patient was advised to follow-up with her primary care doctor and return the emergency room with worsening symptoms.    Final Impression      1. Peripheral edema          DISPOSITION  Disposition: Discharged home    Comment: Please note this report has been produced using speech recognition software and may contain errors related to that system including errors in grammar, punctuation, and spelling, as well as words and phrases that may be inappropriate.  If there are any questions or concerns please feel free to contact the dictating provider for clarification.    ROLO Rascon-SILVESTRE Morales APRN-SILVESTRE  07/27/24 [id number]

## 2024-09-30 ENCOUNTER — OFFICE VISIT (OUTPATIENT)
Dept: OBSTETRICS AND GYNECOLOGY | Facility: CLINIC | Age: 34
End: 2024-09-30
Payer: MEDICAID

## 2024-09-30 VITALS
HEART RATE: 88 BPM | BODY MASS INDEX: 46.94 KG/M2 | DIASTOLIC BLOOD PRESSURE: 84 MMHG | SYSTOLIC BLOOD PRESSURE: 119 MMHG | WEIGHT: 282.1 LBS

## 2024-09-30 DIAGNOSIS — Z01.419 WOMEN'S ANNUAL ROUTINE GYNECOLOGICAL EXAMINATION: ICD-10-CM

## 2024-09-30 DIAGNOSIS — Z11.3 SCREENING FOR STD (SEXUALLY TRANSMITTED DISEASE): Primary | ICD-10-CM

## 2024-09-30 PROCEDURE — RXMED WILLOW AMBULATORY MEDICATION CHARGE

## 2024-09-30 PROCEDURE — 87491 CHLMYD TRACH DNA AMP PROBE: CPT

## 2024-09-30 PROCEDURE — 87205 SMEAR GRAM STAIN: CPT

## 2024-09-30 PROCEDURE — 1036F TOBACCO NON-USER: CPT

## 2024-09-30 PROCEDURE — 99213 OFFICE O/P EST LOW 20 MIN: CPT

## 2024-09-30 PROCEDURE — 87661 TRICHOMONAS VAGINALIS AMPLIF: CPT

## 2024-09-30 PROCEDURE — 87591 N.GONORRHOEAE DNA AMP PROB: CPT

## 2024-09-30 PROCEDURE — 99213 OFFICE O/P EST LOW 20 MIN: CPT | Mod: GC

## 2024-09-30 RX ORDER — LEVONORGESTREL/ETHIN.ESTRADIOL 0.1-0.02MG
1 TABLET ORAL
COMMUNITY
Start: 2024-02-14 | End: 2024-09-30 | Stop reason: SDUPTHER

## 2024-09-30 RX ORDER — LEVONORGESTREL/ETHIN.ESTRADIOL 0.1-0.02MG
1 TABLET ORAL
Qty: 28 TABLET | Refills: 12 | Status: SHIPPED | OUTPATIENT
Start: 2024-09-30

## 2024-09-30 NOTE — ASSESSMENT & PLAN NOTE
- Patient doing well, no gyn concerns   - Pap UTD   - POC pregnancy test negative   - Urine STD tests, vaginitis panel collected.   - OCP refill sent  - Discussed tubal sterilization. Not yet ready to sign federal consents. Not interested in LARC at this time.

## 2024-09-30 NOTE — PROGRESS NOTES
GYN Visit  9/30/2024    HPI:   Pari Mayes is a 34 y.o. here for annual visit.     Pari normally has monthly, light periods. States current menses has been heavier than normal. She takes OCPs, but not been taking them for approximately 2 months now. She has had intercourse recently and is concerned she could be pregnant. Denies unusual vaginal discharge or odor. She is considering a tubal ligation. She is working on losing weight and has recently started taking Trulicity through [facility]. Desires urine STD testing and testing for BV.       Social History     Substance and Sexual Activity   Sexual Activity Yes    Partners: Male    Birth control/protection: None     Objective   /84   Pulse 88   Wt 128 kg (282 lb 1.6 oz)   LMP 09/26/2024 (Exact Date)   BMI 46.94 kg/m²      General:   Alert and oriented, in no acute distress   Neck: Supple. No visible thyromegaly.    Breast/Axilla: Normal to palpation bilaterally without masses, skin changes, or nipple discharge.    Abdomen: Soft, non-tender, without masses or organomegaly   Vulva: Normal architecture without erythema, masses, or lesions.    Vagina: Normal mucosa without lesions, masses, or atrophy. No abnormal vaginal discharge.    Cervix: Normal without masses, lesions, or signs of cervicitis.    Uterus: Normal mobile, non-enlarged uterus    Adnexa: Normal without masses or lesions   Pelvic Floor No POP noted. No high tone pelvic floor    Psych Normal affect. Normal mood.      Assessment and Plan:    Problem List Items Addressed This Visit       Women's annual routine gynecological examination    Current Assessment & Plan     - Patient doing well, no gyn concerns   - Pap UTD   - POC pregnancy test negative   - Urine STD tests, vaginitis panel collected.   - OCP refill sent  - Discussed tubal sterilization. Not yet ready to sign federal consents. Not interested in LARC at this time.          Relevant Medications    levonorgestreL-ethinyl estrad (Aviane,  Arie [place]) 0.1-20 mg-mcg tablet    Other Relevant Orders    Vaginitis Gram Stain For Bacterial Vaginosis + Yeast     Other Visit Diagnoses       Screening for STD (sexually transmitted disease)    -  Primary    Relevant Orders    Neisseria gonorrhoeae, Amplified    Chlamydia trachomatis, Amplified    Trichomonas vaginalis, Amplified           Orders Placed This Encounter   Procedures    Vaginitis Gram Stain For Bacterial Vaginosis + Yeast     Order Specific Question:   Release result to MyChart     Answer:   Immediate [1]    Neisseria gonorrhoeae, Amplified     Standing Status:   Future     Standing Expiration Date:   9/30/2025     Order Specific Question:   Release result to MyChart     Answer:   Immediate    Chlamydia trachomatis, Amplified     Standing Status:   Future     Standing Expiration Date:   9/30/2025     Order Specific Question:   Release result to MyChart     Answer:   Immediate    Trichomonas vaginalis, Amplified     Standing Status:   Future     Standing Expiration Date:   9/30/2025     Order Specific Question:   Release result to MyChart     Answer:   Immediate       RTC for annual GYN exam.     Seen & d/w LENORE Ramachandran MD  PGY-II, Obstetrics & Gynecology   [facility]

## 2024-10-01 LAB
C TRACH RRNA SPEC QL NAA+PROBE: NEGATIVE
N GONORRHOEA DNA SPEC QL PROBE+SIG AMP: NEGATIVE
T VAGINALIS RRNA SPEC QL NAA+PROBE: NEGATIVE

## 2024-10-03 LAB
CLUE CELLS VAG LPF-#/AREA: PRESENT /[LPF]
NUGENT SCORE: 8
YEAST VAG WET PREP-#/AREA: ABNORMAL

## 2024-10-04 DIAGNOSIS — N76.0 BACTERIAL VAGINOSIS: Primary | ICD-10-CM

## 2024-10-04 DIAGNOSIS — B96.89 BACTERIAL VAGINOSIS: Primary | ICD-10-CM

## 2024-10-04 RX ORDER — METRONIDAZOLE 500 MG/1
500 TABLET ORAL 2 TIMES DAILY
Qty: 14 TABLET | Refills: 0 | Status: SHIPPED | OUTPATIENT
Start: 2024-10-04 | End: 2024-10-13

## 2024-10-06 PROCEDURE — RXMED WILLOW AMBULATORY MEDICATION CHARGE

## 2024-10-16 ENCOUNTER — TELEPHONE (OUTPATIENT)
Dept: OBSTETRICS AND GYNECOLOGY | Facility: CLINIC | Age: 34
End: 2024-10-16
Payer: MEDICAID
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
## 2024-10-18 ENCOUNTER — PHARMACY VISIT (OUTPATIENT)
Dept: PHARMACY | Facility: CLINIC | Age: 34
End: 2024-10-18
Payer: MEDICAID

## 2025-08-04 ENCOUNTER — APPOINTMENT (OUTPATIENT)
Dept: PRIMARY CARE | Facility: CLINIC | Age: 35
End: 2025-08-04
Payer: MEDICAID

## 2025-08-30 ENCOUNTER — APPOINTMENT (OUTPATIENT)
Dept: RADIOLOGY | Facility: HOSPITAL | Age: 35
End: 2025-08-30
Payer: MEDICAID

## 2025-08-30 ENCOUNTER — HOSPITAL ENCOUNTER (EMERGENCY)
Facility: HOSPITAL | Age: 35
Discharge: HOME | End: 2025-08-30
Attending: EMERGENCY MEDICINE
Payer: MEDICAID

## 2025-08-30 ASSESSMENT — PAIN DESCRIPTION - LOCATION: LOCATION: KNEE

## 2025-08-30 ASSESSMENT — PAIN DESCRIPTION - DESCRIPTORS: DESCRIPTORS: ACHING

## 2025-08-30 ASSESSMENT — PAIN SCALES - GENERAL
PAINLEVEL_OUTOF10: 0 - NO PAIN
PAINLEVEL_OUTOF10: 10 - WORST POSSIBLE PAIN

## 2025-08-30 ASSESSMENT — PAIN DESCRIPTION - PAIN TYPE: TYPE: ACUTE PAIN

## 2025-08-30 ASSESSMENT — PAIN - FUNCTIONAL ASSESSMENT: PAIN_FUNCTIONAL_ASSESSMENT: 0-10

## 2025-08-30 ASSESSMENT — PAIN DESCRIPTION - ORIENTATION: ORIENTATION: LEFT

## 2025-09-05 ENCOUNTER — OFFICE VISIT (OUTPATIENT)
Dept: ORTHOPEDIC SURGERY | Facility: HOSPITAL | Age: 35
End: 2025-09-05
Payer: MEDICAID

## 2025-09-05 ENCOUNTER — HOSPITAL ENCOUNTER (OUTPATIENT)
Dept: RADIOLOGY | Facility: HOSPITAL | Age: 35
Discharge: HOME | End: 2025-09-05
Payer: MEDICAID

## 2025-09-05 DIAGNOSIS — S83.512A DISRUPTION OF ANTERIOR CRUCIATE LIGAMENT OF KNEE, LEFT, INITIAL ENCOUNTER: ICD-10-CM

## 2025-09-05 DIAGNOSIS — S83.242D ACUTE MEDIAL MENISCUS TEAR OF LEFT KNEE, SUBSEQUENT ENCOUNTER: ICD-10-CM

## 2025-09-05 DIAGNOSIS — S93.402A: ICD-10-CM

## 2025-09-05 DIAGNOSIS — M25.572 ACUTE LEFT ANKLE PAIN: ICD-10-CM

## 2025-09-05 DIAGNOSIS — M25.572 ACUTE LEFT ANKLE PAIN: Primary | ICD-10-CM

## 2025-09-05 PROCEDURE — 99203 OFFICE O/P NEW LOW 30 MIN: CPT | Performed by: SPECIALIST/TECHNOLOGIST

## 2025-09-05 PROCEDURE — 73610 X-RAY EXAM OF ANKLE: CPT | Mod: LT

## 2025-09-05 PROCEDURE — 1036F TOBACCO NON-USER: CPT | Performed by: SPECIALIST/TECHNOLOGIST

## 2025-09-05 PROCEDURE — 99204 OFFICE O/P NEW MOD 45 MIN: CPT | Performed by: SPECIALIST/TECHNOLOGIST

## 2025-09-05 RX ORDER — NAPROXEN 500 MG/1
500 TABLET ORAL 2 TIMES DAILY
Qty: 28 TABLET | Refills: 0 | Status: SHIPPED | OUTPATIENT
Start: 2025-09-05 | End: 2025-09-19

## 2025-09-05 ASSESSMENT — PAIN - FUNCTIONAL ASSESSMENT: PAIN_FUNCTIONAL_ASSESSMENT: 0-10

## 2025-09-05 ASSESSMENT — PAIN SCALES - GENERAL: PAINLEVEL_OUTOF10: 8
